# Patient Record
Sex: MALE
[De-identification: names, ages, dates, MRNs, and addresses within clinical notes are randomized per-mention and may not be internally consistent; named-entity substitution may affect disease eponyms.]

---
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
## 2018-10-06 ENCOUNTER — HOSPITAL ENCOUNTER (EMERGENCY)
Dept: HOSPITAL 14 - H.ER | Age: 18
LOS: 1 days | Discharge: HOME | End: 2018-10-07
Payer: SELF-PAY

## 2018-10-06 VITALS — BODY MASS INDEX: 25.7 KG/M2

## 2018-10-06 DIAGNOSIS — B34.9: Primary | ICD-10-CM

## 2018-10-07 VITALS
TEMPERATURE: 98.4 F | SYSTOLIC BLOOD PRESSURE: 133 MMHG | HEART RATE: 81 BPM | RESPIRATION RATE: 18 BRPM | OXYGEN SATURATION: 100 % | DIASTOLIC BLOOD PRESSURE: 64 MMHG

## 2018-10-07 NOTE — ED PDOC
HPI:  Headache


Chief Complaint (Provider): Frontal headace, fever 


History Per: Patient


History/Exam Limitations: no limitations


Onset/Duration Of Symptoms: Hrs


Current Symptoms Are (Timing): Still Present


Additional Complaint(s): 





19 yo male with no medical problems presents for evaluation of fever and 

headache which began this evening. Pt reports taking motrin for headache but 

states it is the same. Pt states this is not the worst headache of his life. Pt 

did not take temperature at home but reports feeling hot. No sore throat. No ear

pain. No cough. 





<Yesenia Love - Last Filed: 10/07/18 01:34>





<Balwinder Meyers - Last Filed: 10/08/18 05:07>


Time Seen by Provider: 10/06/18 23:35


Chief Complaint (Nursing): Headache





Past Medical History


Reviewed: Historical Data, Nursing Documentation, Vital Signs


Vital Signs: 





                                Last Vital Signs











Temp  98.7 F   10/06/18 23:19


 


Pulse  80   10/06/18 23:19


 


Resp  16   10/06/18 23:19


 


BP  130/77   10/06/18 23:19


 


Pulse Ox  98   10/06/18 23:19














- Medical History


PMH: No Chronic Diseases


Other PMH: Headaches in the past, has not been diagnosed with migraines 





- Surgical History


Surgical History: No Surg Hx





- Family History


Family History: States: Unknown Family Hx





- Living Arrangements


Living Arrangements: With Family





- Social History


Current smoker - smoking cessation education provided: No





<Yesenia Love - Last Filed: 10/07/18 01:34>


Vital Signs: 





                                Last Vital Signs











Temp  98.4 F   10/07/18 02:00


 


Pulse  81   10/07/18 02:00


 


Resp  18   10/07/18 02:00


 


BP  133/64   10/07/18 02:00


 


Pulse Ox  100   10/07/18 02:00














<Balwinder Meyers - Last Filed: 10/08/18 05:07>





- Home Medications


Home Medications: 


                                Ambulatory Orders











 Medication  Instructions  Recorded


 


Azithromycin [Zithromax Z-Olayinka] 250 mg PO DAILY #6 tab 02/02/16


 


Ibuprofen [Motrin] 400 mg PO Q6H PRN #20 tab 02/02/16


 


Guaifenesin [Mucinex] 600 mg PO BID #14 tab.er.12h 09/09/17


 


RX: Azithromycin [Zithromax] 250 mg PO DAILY #6 tab 09/09/17














- Allergies


Allergies/Adverse Reactions: 


                                    Allergies











Allergy/AdvReac Type Severity Reaction Status Date / Time


 


No Known Allergies Allergy   Verified 10/06/18 23:19














Review of Systems


ROS Statement: Except As Marked, All Systems Reviewed And Found Negative


Constitutional: Positive for: Fever.  Negative for: Chills, Sweats, Weakness, 

Malaise


ENT: Negative for: Ear Pain, Nose Discharge, Throat Pain, Throat Swelling


Cardiovascular: Negative for: Chest Pain, Palpitations


Respiratory: Negative for: Cough, Shortness of Breath


Gastrointestinal: Negative for: Nausea, Vomiting, Abdominal Pain


Neurological: Positive for: Headache





<Yesenia Love Last Filed: 10/07/18 01:34>





Physical Exam





- Reviewed


Nursing Documentation Reviewed: Yes


Vital Signs Reviewed: Yes





- Physical Exam


Appears: Positive for: Well, Non-toxic, No Acute Distress


Head Exam: Positive for: ATRAUMATIC, NORMAL INSPECTION, NORMOCEPHALIC


Skin: Positive for: Normal Color, Warm, DRY


Eye Exam: Positive for: Normal appearance


ENT: Positive for: Normal ENT Inspection, Pharynx Is, TM Is/Are


Neck: Positive for: Normal, Painless ROM


Cardiovascular/Chest: Positive for: Regular Rate, Rhythm


Respiratory: Positive for: Normal Breath Sounds.  Negative for: Accessory Muscle

 Use, Respiratory Distress


Back: Positive for: Normal Inspection


Extremity: Positive for: Normal ROM


Neurologic/Psych: Positive for: Alert, CNs II-XII, Oriented, Gait.  Negative 

for: Aphasia, Facial Droop





<Yesenia Love - Last Filed: 10/07/18 01:34>





- ECG


O2 Sat by Pulse Oximetry: 98


Pulse Ox Interpretation: Normal





<Yesenia Love - Last Filed: 10/07/18 01:34>





Medical Decision Making


Medical Decision Making: 





tylenol for headache. 


Influenza pending 





<Yesenia Love Last Filed: 10/07/18 01:34>





Disposition





- Patient ED Disposition


Is Patient to be Admitted: No


Counseled Patient/Family Regarding: Diagnosis, Need For Followup





- Disposition


Disposition: Routine/Home


Disposition Time: 01:35





<Yesenia Love Last Filed: 10/07/18 01:34>





<Mira,Balwinder - Last Filed: 10/08/18 05:07>





- Clinical Impression


Clinical Impression: 


 Viral illness








- Disposition


Referrals: 


[facility] [Outside]


Condition: STABLE


Instructions:  Viral Syndrome (DC)


Forms:  [facility] (English)


Print Language: [place]





- PA / NP / Resident Statement


MD/DO has reviewed & agrees with the documentation as recorded.





<Balwinder Meyers - Last Filed: 10/08/18 05:07>